# Patient Record
Sex: FEMALE | Race: WHITE | NOT HISPANIC OR LATINO | Employment: UNEMPLOYED | URBAN - METROPOLITAN AREA
[De-identification: names, ages, dates, MRNs, and addresses within clinical notes are randomized per-mention and may not be internally consistent; named-entity substitution may affect disease eponyms.]

---

## 2020-01-09 ENCOUNTER — OFFICE VISIT (OUTPATIENT)
Dept: URGENT CARE | Facility: CLINIC | Age: 50
End: 2020-01-09

## 2020-01-09 VITALS
TEMPERATURE: 98 F | OXYGEN SATURATION: 99 % | SYSTOLIC BLOOD PRESSURE: 109 MMHG | DIASTOLIC BLOOD PRESSURE: 63 MMHG | RESPIRATION RATE: 16 BRPM | BODY MASS INDEX: 26.82 KG/M2 | HEART RATE: 96 BPM | WEIGHT: 161 LBS | HEIGHT: 65 IN

## 2020-01-09 DIAGNOSIS — H04.123 DRY EYES: ICD-10-CM

## 2020-01-09 DIAGNOSIS — L73.9 FOLLICULITIS: Primary | ICD-10-CM

## 2020-01-09 PROCEDURE — G0382 LEV 3 HOSP TYPE B ED VISIT: HCPCS | Performed by: PHYSICIAN ASSISTANT

## 2020-01-09 RX ORDER — DULOXETIN HYDROCHLORIDE 60 MG/1
60 CAPSULE, DELAYED RELEASE ORAL
COMMUNITY

## 2020-01-09 RX ORDER — METHYLPHENIDATE HYDROCHLORIDE 10 MG/1
10 CAPSULE, EXTENDED RELEASE ORAL
COMMUNITY

## 2020-01-09 RX ORDER — DOXYCYCLINE 100 MG/1
100 CAPSULE ORAL 2 TIMES DAILY
Qty: 14 CAPSULE | Refills: 0 | Status: SHIPPED | OUTPATIENT
Start: 2020-01-09 | End: 2020-01-16

## 2020-01-09 RX ORDER — BUPROPION HYDROCHLORIDE 300 MG/1
300 TABLET ORAL
COMMUNITY

## 2020-01-09 RX ORDER — CLONAZEPAM 2 MG/1
2 TABLET ORAL 3 TIMES DAILY
COMMUNITY
Start: 2019-12-14

## 2020-01-09 RX ORDER — CARBOXYMETHYLCELLULOSE SODIUM 5 MG/ML
1 SOLUTION/ DROPS OPHTHALMIC 3 TIMES DAILY PRN
Qty: 1 BOTTLE | Refills: 0 | Status: SHIPPED | OUTPATIENT
Start: 2020-01-09

## 2020-01-09 NOTE — PROGRESS NOTES
[facility]        NAME: Tyron Prater is a 51 y o  female  : 1970    MRN: [id number]  DATE: 2020  TIME: 12:35 PM    Assessment and Plan   Folliculitis [L53 5]  1  Folliculitis  doxycycline monohydrate (MONODOX) 100 mg capsule   2  Dry eyes  carboxymethylcellulose 0 5 % SOLN         Patient Instructions     Doxycycline and prescribed    Refresh tears as prescribed  Cool air humidifier   Follow up with ophthalmologist if symptoms worsen or do not improve today with treatment  Follow up with PCP in 3-5 days  Proceed to  ER if symptoms worsen  Chief Complaint     Chief Complaint   Patient presents with    Rash     has a rash on right side of scalp  Thinks she has shingles  It hurts sometimes and itches sometime  Hair is dried out and frizzy from it    Eye Problem     bilateral eyes dry and blurry  Thinks it is either from her rosacea or from using bleach         History of Present Illness       PMH of Rosacea  Rash   This is a new problem  The current episode started 1 to 4 weeks ago (2w)  Location: R side of scalp  The rash is characterized by pain, itchiness, dryness and draining (clear discharge)  It is unknown if there was an exposure to a precipitant  Pertinent negatives include no eye pain  Treatments tried: she has been scratching area    Eye Problem    Both eyes are affected  This is a new problem  Episode onset: 2d  The problem occurs constantly  The patient is experiencing no pain  Pertinent negatives include no eye discharge, eye redness, itching or photophobia  Treatments tried: 1 drop of refresh tears but ran out  Review of Systems   Review of Systems   Constitutional: Negative  HENT: Negative  Eyes: Positive for visual disturbance (occasionally blurry "when its dry")  Negative for photophobia, pain, discharge, redness and itching  Skin: Positive for rash           Current Medications       Current Outpatient Medications:     Amphet-Dextroamphet 3-Bead ER (MYDAYIS) 50 MG CP24, Take 50 mg by mouth, Disp: , Rfl:     buPROPion (WELLBUTRIN XL) 300 mg 24 hr tablet, Take 300 mg by mouth, Disp: , Rfl:     clonazePAM (KlonoPIN) 2 mg tablet, Take 2 mg by mouth 3 (three) times a day, Disp: , Rfl:     DULoxetine (CYMBALTA) 60 mg delayed release capsule, Take 60 mg by mouth, Disp: , Rfl:     methylphenidate (METADATE CD) 10 MG CR capsule, Take 10 mg by mouth, Disp: , Rfl:     carboxymethylcellulose 0 5 % SOLN, Administer 1 drop to both eyes 3 (three) times a day as needed for dry eyes, Disp: 1 Bottle, Rfl: 0    doxycycline monohydrate (MONODOX) 100 mg capsule, Take 1 capsule (100 mg total) by mouth 2 (two) times a day for 7 days, Disp: 14 capsule, Rfl: 0    Current Allergies     Allergies as of 01/09/2020 - never reviewed   Allergen Reaction Noted    Oxaprozin Swelling 08/14/2018            The following portions of the patient's history were reviewed and updated as appropriate: allergies, current medications, past family history, past medical history, past social history, past surgical history and problem list      Past Medical History:   Diagnosis Date    Bipolar 1 disorder ([address] )        Past Surgical History:   Procedure Laterality Date    APPENDECTOMY      CERVICAL FUSION      ULNAR NERVE TRANSPOSITION         Family History   Problem Relation Age of Onset    Cancer Father          Medications have been verified  Objective   /63   Pulse 96   Temp 98 °F (36 7 °C) (Tympanic)   Resp 16   Ht 5' 5" (1 651 m)   Wt 73 kg (161 lb)   LMP 01/07/2020   SpO2 99%   BMI 26 79 kg/m²        Physical Exam     Physical Exam   Constitutional: She appears well-developed and well-nourished  HENT:   Mouth/Throat: Oropharynx is clear and moist    Eyes: Pupils are equal, round, and reactive to light  Conjunctivae and EOM are normal  Right eye exhibits no discharge  Left eye exhibits no discharge     Cardiovascular: Normal rate, regular rhythm and normal heart sounds  Exam reveals no gallop and no friction rub  No murmur heard  Pulmonary/Chest: Effort normal and breath sounds normal  No respiratory distress  She has no wheezes  She has no rales  She exhibits no tenderness  Lymphadenopathy:     She has no cervical adenopathy  Neurological: She is alert  Skin: Skin is warm     Scattered papules with yellow crusted discharge and surrounding erythema throughout B/L occipital aspect of scalp

## 2020-01-09 NOTE — PATIENT INSTRUCTIONS
Doxycycline and prescribed    Refresh tears as prescribed  Cool air humidifier   Follow up with ophthalmologist if symptoms worsen or do not improve today with treatment  Follow up with PCP in 3-5 days  Proceed to  ER if symptoms worsen  Dry Eye Syndrome   WHAT YOU NEED TO KNOW:   Dry eye syndrome happens when the eye has trouble keeping moisture  This may be caused by a lack of tears or having tears that cannot moisturize the eye  It may also happen when tears leave the eye too quickly  Dry eye syndrome may also be called dry eye disease or keratoconjunctivitis sicca ([facility])  DISCHARGE INSTRUCTIONS:   Contact your healthcare provider if:   · Your dry eyes do not get better with treatment or get worse  · You have thick, yellow drainage from one or both eyes  · Your eyelids or skin around your eyes is red and swollen  · You have changes in your vision  · You have questions or concerns about your condition or care  Medicines:   · Medicine  may be given to decrease pain or swelling, or treat an eye infection  You may also need medicine to help your eyes make more tears  These medicines will be given as eyedrops  · Take your medicine as directed  Contact your healthcare provider if you think your medicine is not helping or if you have side effects  Tell him of her if you are allergic to any medicine  Keep a list of the medicines, vitamins, and herbs you take  Include the amounts, and when and why you take them  Bring the list or the pill bottles to follow-up visits  Carry your medicine list with you in case of an emergency  Self-care:   · Use artificial tears, gels, and lubricating ointments  as directed  They are available without a doctor's order  These products can replace tears and help add moisture to your eyes  Ask your healthcare provider how often to use these products  Also ask where to buy them  · Apply a warm compress to your eyelids  as directed   Use a soft washcloth soaked in warm water  Leave the compress on your eyelids for 5 minutes  Gently massage your eyelids after you remove the compress  These actions may help open your tear glands  Your tear glands can make oil that will help keep tears and moisture on the eye's surface  · Wear glasses or sunglasses  that cover the sides of your eyes and fit close to your face  These will protect your eyes from dry air  They may also help keep moisture in your eyes  · Use a humidifier  in your home  A humidifier may help keep moisture in the air and prevent dry eyes  · Take vitamins and supplements  as directed  Certain vitamins and supplements may help decrease eye dryness  Examples include fish oil and vitamin A  Ask your healthcare provider what supplements you need and how often to take them  · Eat foods with high amounts of omega-3 fatty acids  Examples include salmon, tuna, walnuts, and flaxseeds  Omega-3 fatty acids may help relieve dry eyes  Ask your healthcare provider for a list of foods that contain fatty acids and how much you should eat each day  · Do not smoke  Nicotine and other chemicals in cigarettes and cigars can cause lung damage  Smoke from cigarettes and cigars can make dry eyes worse  Ask your healthcare provider for information if you currently smoke and need help to quit  E-cigarettes or smokeless tobacco still contain nicotine  Talk to your healthcare provider before you use these products  Follow up with your healthcare provider as directed:  Write down your questions so you remember to ask them during your visits  © 2017 [address] Information is for End User's use only and may not be sold, redistributed or otherwise used for commercial purposes  All illustrations and images included in CareNotes® are the copyrighted property of [employer]  or [employer]  The above information is an  only   It is not intended as medical advice for individual conditions or treatments  Talk to your doctor, nurse or pharmacist before following any medical regimen to see if it is safe and effective for you  Folliculitis   WHAT YOU NEED TO KNOW:   Folliculitis is inflammation of your hair follicles  A hair follicle is a sac under your skin  Your hair grows out of the follicle  Folliculitis is caused by bacteria or fungus, most commonly a germ called Staph  Folliculitis can occur anywhere you have hair  DISCHARGE INSTRUCTIONS:   Medicines:   · Antibiotics: This medicine is given to fight or prevent an infection caused by bacteria  It may be given as an ointment that you apply to your skin or as a pill  Always take your antibiotics exactly as ordered by your healthcare provider  Never save antibiotics or take leftover antibiotics that were given to you for another illness  · Antifungal medicine: This medicine helps kill fungus that may be causing your folliculitis  It may be given as an cream that you apply to your skin or as a pill  · NSAIDs , such as ibuprofen, help decrease swelling, pain, and fever  This medicine is available with or without a doctor's order  NSAIDs can cause stomach bleeding or kidney problems in certain people  If you take blood thinner medicine, always ask if NSAIDs are safe for you  Always read the medicine label and follow directions  Do not give these medicines to children under 10months of age without direction from your child's healthcare provider  · Antihistamines: This medicine may be given to help decrease itching  · Take your medicine as directed  Contact your healthcare provider if you think your medicine is not helping or if you have side effects  Tell him of her if you are allergic to any medicine  Keep a list of the medicines, vitamins, and herbs you take  Include the amounts, and when and why you take them  Bring the list or the pill bottles to follow-up visits   Carry your medicine list with you in case of an emergency  Follow up with your healthcare provider or dermatologist as directed:  Write down your questions so you remember to ask them during your visits  Manage folliculitis:   · Use warm compresses:  Wet a washcloth with warm water and apply it to the infected skin area to help decrease pain and swelling  Warm compresses may also help drain pus and improve healing  · Clean the area:  Use antibacterial soap to wash the affected area  Change your washcloths and towels every day  · Avoid shaving the area: If possible, do not shave areas that have folliculitis  If you must shave, use an electric razor or new blade every time you shave  Prevent folliculitis:   · Do not share personal items:  Do not share towels, soap, or any personal items with other people  · Do not wear tight clothing:  Do not wear tight-fitting clothes that rub against and irritate your skin  · Treat skin injuries right away:  Treat injuries such as cuts and scrapes right away  Wash them with warm, soapy water, and cover the area to prevent infection  Contact your healthcare provider or dermatologist if:  · You have a fever  · You have foul-smelling pus coming from the bumps on your skin  · Your rash is spreading  · You have questions or concerns about your condition or care  Return to the emergency department if:  · You develop large areas of red, warm, tender skin around the folliculitis  · You develop boils  © 2017 [address] Information is for End User's use only and may not be sold, redistributed or otherwise used for commercial purposes  All illustrations and images included in CareNotes® are the copyrighted property of [employer]  or [employer]  The above information is an  only  It is not intended as medical advice for individual conditions or treatments   Talk to your doctor, nurse or pharmacist before following any medical regimen to see if it is safe and effective for you

## 2020-01-22 ENCOUNTER — OFFICE VISIT (OUTPATIENT)
Dept: URGENT CARE | Facility: CLINIC | Age: 50
End: 2020-01-22

## 2020-01-22 VITALS
OXYGEN SATURATION: 100 % | BODY MASS INDEX: 26.82 KG/M2 | HEIGHT: 65 IN | DIASTOLIC BLOOD PRESSURE: 65 MMHG | TEMPERATURE: 98.6 F | RESPIRATION RATE: 16 BRPM | WEIGHT: 161 LBS | SYSTOLIC BLOOD PRESSURE: 114 MMHG | HEART RATE: 103 BPM

## 2020-01-22 DIAGNOSIS — R21 RASH: Primary | ICD-10-CM

## 2020-01-22 PROCEDURE — G0382 LEV 3 HOSP TYPE B ED VISIT: HCPCS | Performed by: PHYSICIAN ASSISTANT

## 2020-01-22 RX ORDER — LIDOCAINE 40 MG/G
CREAM TOPICAL AS NEEDED
Qty: 30 G | Refills: 0 | Status: SHIPPED | OUTPATIENT
Start: 2020-01-22

## 2020-01-22 NOTE — PROGRESS NOTES
[facility]        NAME: Julianne Kerr is a 51 y o  female  : 1970    MRN: [id number]  DATE: 2020  TIME: 7:00 PM    Assessment and Plan   Rash [R21]  1  Rash  Ambulatory referral to Dermatology    lidocaine (LMX) 4 % cream     Informed patient that she is taking too much tylenol  Patient Instructions     Lidocaine cream  Ibuprofen for pain  Follow up with dermatologist ([facility] [phone]  Follow up with PCP in 3-5 days  Proceed to  ER if symptoms worsen  Chief Complaint     Chief Complaint   Patient presents with    Skin Problem     c/o raised red areas to head causing severe pain, concerned it is shingles  Seen here  with same symptoms, tx with antibiotics but is now worse  History of Present Illness       Patient initially presented for rash on , two weeks after rash began  Rash   This is a new problem  The current episode started 1 to 4 weeks ago  The problem has been gradually worsening since onset  The affected locations include the scalp  The rash is characterized by pain and redness  She was exposed to nothing  Treatments tried: keflex; tylenol (6 at once) The treatment provided no relief  Review of Systems   Review of Systems   Constitutional: Negative  HENT: Negative  Skin: Positive for rash           Current Medications       Current Outpatient Medications:     buPROPion (WELLBUTRIN XL) 300 mg 24 hr tablet, Take 300 mg by mouth, Disp: , Rfl:     carboxymethylcellulose 0 5 % SOLN, Administer 1 drop to both eyes 3 (three) times a day as needed for dry eyes, Disp: 1 Bottle, Rfl: 0    clonazePAM (KlonoPIN) 2 mg tablet, Take 2 mg by mouth 3 (three) times a day, Disp: , Rfl:     DULoxetine (CYMBALTA) 60 mg delayed release capsule, Take 60 mg by mouth, Disp: , Rfl:     methylphenidate (METADATE CD) 10 MG CR capsule, Take 10 mg by mouth, Disp: , Rfl:     Amphet-Dextroamphet 3-Bead ER (MYDAYIS) 50 MG CP24, Take 50 mg by mouth, Disp: , Rfl:     lidocaine (LMX) 4 % cream, Apply topically as needed for moderate pain, Disp: 30 g, Rfl: 0    Current Allergies     Allergies as of 01/22/2020 - Reviewed 01/22/2020   Allergen Reaction Noted    Oxaprozin Swelling 08/14/2018            The following portions of the patient's history were reviewed and updated as appropriate: allergies, current medications, past family history, past medical history, past social history, past surgical history and problem list      Past Medical History:   Diagnosis Date    ADHD (attention deficit hyperactivity disorder)     Bipolar 1 disorder ([facility] [address] )     Depression     Panic attacks     PTSD (post-traumatic stress disorder)        Past Surgical History:   Procedure Laterality Date    APPENDECTOMY      CERVICAL FUSION      ULNAR NERVE TRANSPOSITION         Family History   Problem Relation Age of Onset    Cancer Father          Medications have been verified  Objective   /65   Pulse 103   Temp 98 6 °F (37 °C) (Tympanic)   Resp 16   Ht 5' 5" (1 651 m)   Wt 73 kg (161 lb)   LMP 01/07/2020   SpO2 100%   BMI 26 79 kg/m²        Physical Exam     Physical Exam   Constitutional: She appears well-developed and well-nourished  No distress  Cardiovascular: Normal rate, regular rhythm and normal heart sounds  Exam reveals no gallop and no friction rub  No murmur heard  Pulmonary/Chest: Effort normal and breath sounds normal  No respiratory distress  She has no wheezes  She has no rales  She exhibits no tenderness  Neurological: She is alert  Skin: Skin is warm  Rash (scattered erythematous papular rash over occipital region) noted  Psychiatric: She has a normal mood and affect   Her behavior is normal  Judgment and thought content normal

## 2020-01-22 NOTE — PATIENT INSTRUCTIONS
Lidocaine cream  Ibuprofen for pain  [phone]  Follow up with PCP in 3-5 days  Proceed to  ER if symptoms worsen  Acute Rash   WHAT YOU NEED TO KNOW:   A rash is irritation, redness, or itchiness in the skin or mucus membranes  Mucus membranes are areas such as the lining of your nose or throat  Acute means the rash starts suddenly, worsens quickly, and lasts a short time  An acute rash may be caused by a disease, such as hepatitis or vasculitis  The rash may be a reaction to something you are allergic to, such as certain foods, or latex  Certain medicines, including antibiotics, NSAIDs, prescription pain medicine, and aspirin can also cause a rash  DISCHARGE INSTRUCTIONS:   Return to the emergency department if:   · You have sudden trouble breathing or chest pain  · You are vomiting, have a headache or muscle aches, and your throat hurts  Contact your healthcare provider if:   · You have a fever  · You get open wounds from scratching your skin, or you have a wound that is red, swollen, or painful  · Your rash lasts longer than 3 months  · You have swelling or pain in your joints  · You have questions or concerns about your condition or care  Medicines:  If your rash does not go away on its own, you may need the following medicines:  · Antihistamines  may be given to help decrease itching  · Steroids  may be given to decrease inflammation  · Antibiotics  help fight or prevent a bacterial infection  · Take your medicine as directed  Contact your healthcare provider if you think your medicine is not helping or if you have side effects  Tell him of her if you are allergic to any medicine  Keep a list of the medicines, vitamins, and herbs you take  Include the amounts, and when and why you take them  Bring the list or the pill bottles to follow-up visits   Carry your medicine list with you in case of an emergency  Prevent a rash or care for your skin when you have a rash:  Dry skin can lead to more problems  Do not scratch your skin if it itches  You may cause a skin infection by scratching  The following may prevent dry skin, and help your skin look better:  · Use thick cream lotions or petroleum jelly to help soothe your rash  These products work well on areas with thick skin, such as your feet  Cool compresses may also be used to soothe your skin  Apply a cool compress or a cool, wet towel, and then cover it with a dry towel  · Use lukewarm water when you bathe  Hot water may damage your skin more  Pat your skin dry  Do not rub your skin with a towel  · Use detergents, soaps, shampoos, and bubble baths made for sensitive skin  Wear clothes that are made of cotton instead of nylon or wool  Cotton is softer, so it will not hurt your skin as much  Follow up with your healthcare provider as directed: You may need to see a dermatologist if healthcare providers do not know what is causing your rash  You may also need to see a dermatologist if your rash does not get better even with treatment  You may need to see a dietitian if you have allergies to foods  Write down your questions so you remember to ask them during your visits  © 2017 [address] Information is for End User's use only and may not be sold, redistributed or otherwise used for commercial purposes  All illustrations and images included in CareNotes® are the copyrighted property of [employer]  or [employer]  The above information is an  only  It is not intended as medical advice for individual conditions or treatments  Talk to your doctor, nurse or pharmacist before following any medical regimen to see if it is safe and effective for you

## 2020-04-02 ENCOUNTER — OFFICE VISIT (OUTPATIENT)
Dept: URGENT CARE | Facility: CLINIC | Age: 50
End: 2020-04-02

## 2020-04-02 VITALS
TEMPERATURE: 98.2 F | RESPIRATION RATE: 16 BRPM | HEART RATE: 108 BPM | BODY MASS INDEX: 26.66 KG/M2 | HEIGHT: 65 IN | WEIGHT: 160 LBS | DIASTOLIC BLOOD PRESSURE: 56 MMHG | OXYGEN SATURATION: 100 % | SYSTOLIC BLOOD PRESSURE: 112 MMHG

## 2020-04-02 DIAGNOSIS — L73.9 FOLLICULITIS: Primary | ICD-10-CM

## 2020-04-02 PROCEDURE — G0382 LEV 3 HOSP TYPE B ED VISIT: HCPCS | Performed by: PHYSICIAN ASSISTANT

## 2020-04-02 RX ORDER — DIPHENHYDRAMINE HCL 25 MG
25 TABLET ORAL EVERY 6 HOURS PRN
Qty: 30 TABLET | Refills: 0 | Status: SHIPPED | OUTPATIENT
Start: 2020-04-02

## 2020-04-02 RX ORDER — CLINDAMYCIN HYDROCHLORIDE 300 MG/1
300 CAPSULE ORAL 4 TIMES DAILY
Qty: 40 CAPSULE | Refills: 0 | Status: SHIPPED | OUTPATIENT
Start: 2020-04-02 | End: 2020-04-12

## 2020-04-02 RX ORDER — MULTIVITAMIN
1 TABLET ORAL DAILY
COMMUNITY